# Patient Record
Sex: FEMALE | Race: BLACK OR AFRICAN AMERICAN | NOT HISPANIC OR LATINO | URBAN - METROPOLITAN AREA
[De-identification: names, ages, dates, MRNs, and addresses within clinical notes are randomized per-mention and may not be internally consistent; named-entity substitution may affect disease eponyms.]

---

## 2020-12-10 ENCOUNTER — ON CAMPUS - OUTPATIENT (OUTPATIENT)
Dept: URBAN - METROPOLITAN AREA HOSPITAL 108 | Facility: HOSPITAL | Age: 55
End: 2020-12-10
Payer: COMMERCIAL

## 2020-12-10 DIAGNOSIS — K29.50 UNSPECIFIED CHRONIC GASTRITIS WITHOUT BLEEDING: ICD-10-CM

## 2020-12-10 DIAGNOSIS — K86.89 OTHER SPECIFIED DISEASES OF PANCREAS: ICD-10-CM

## 2020-12-10 DIAGNOSIS — R19.09 OTHER INTRA-ABDOMINAL AND PELVIC SWELLING, MASS AND LUMP: ICD-10-CM

## 2020-12-10 DIAGNOSIS — B96.81 HELICOBACTER PYLORI [H. PYLORI] AS THE CAUSE OF DISEASES CLA: ICD-10-CM

## 2020-12-10 DIAGNOSIS — D17.5 BENIGN LIPOMATOUS NEOPLASM OF INTRA-ABDOMINAL ORGANS: ICD-10-CM

## 2020-12-10 PROCEDURE — 43242 EGD US FINE NEEDLE BX/ASPIR: CPT

## 2020-12-10 PROCEDURE — 43239 EGD BIOPSY SINGLE/MULTIPLE: CPT | Mod: 59

## 2023-03-15 ENCOUNTER — TELEPHONE (OUTPATIENT)
Dept: ORTHOPEDIC SURGERY | Facility: HOSPITAL | Age: 58
End: 2023-03-15
Payer: COMMERCIAL

## 2023-03-15 NOTE — TELEPHONE ENCOUNTER
Risk Factor yes no   Age >75  X   BMI <20 >40  X   Patient History     Chronic Pain (2 or more meds/Pain Management)  X   ETOH (more than 3 drinks Daily)  X   Uncontrolled Depression/Bipolar/Schizoaffective Disorder  X   Arrhythmias  X   Stent placement/MI  X   DVT/PE  X   Pacemaker  X   HTN (uncontrolled or requiring more than 2 medications)  X   CHF/Retained fluids/Edema  X   Stroke with Residual   X   COPD/Asthma  X   DENZEL--Non-compliant with CPAP  X   Diabetes (on insulin or more than 2 meds)         A1C:  X   BPH/Urinary retention (on medication)  X   CKD  X   Home environment and support     Current ambulation status (use of cane, walker, W/C, Multiple falls/weakness)  X   Stairs to enter and throughout home X    Lives Alone  X   Doesn’t have support at home  X     Outpatient Screening Assessment    Home needs: (Walker/BSC):  Has walker  ? Steps 3 steps  Caregiver 24-48hrs post-discharge:  and daughter    Discharge Plan:  HH PT     Prescriptions: Meds to bed    Home medications: NO MEDICATIONS ON FILE.   ? Blood thinner/anti-coag therapy--   ? BPH or diuretic--  ? BP meds--  ? Pain/Anti-inflammatories--  Pre-op Education:  Educate patient on spinal anesthesia/pain control:  ? patient verbalize understanding    Educate patient on hospital course/timeline:  ?  patient verbalize understanding    Joint Care Class:  ?  yes ? no    Notes:   Nothing on File. PAT 3/17  Uni VS TKA.  Does have a high deductible and would like to have HH PT over the bike as she doesn’t think she would be inclined to do the bike and exercises on her own.

## 2023-03-17 ENCOUNTER — PRE-ADMISSION TESTING (OUTPATIENT)
Dept: PREADMISSION TESTING | Facility: HOSPITAL | Age: 58
End: 2023-03-17
Payer: COMMERCIAL

## 2023-03-17 ENCOUNTER — HOSPITAL ENCOUNTER (OUTPATIENT)
Dept: GENERAL RADIOLOGY | Facility: HOSPITAL | Age: 58
Discharge: HOME | End: 2023-03-17
Payer: COMMERCIAL

## 2023-03-17 VITALS
DIASTOLIC BLOOD PRESSURE: 88 MMHG | SYSTOLIC BLOOD PRESSURE: 147 MMHG | TEMPERATURE: 97.6 F | RESPIRATION RATE: 18 BRPM | BODY MASS INDEX: 36.08 KG/M2 | HEART RATE: 78 BPM | OXYGEN SATURATION: 100 % | WEIGHT: 252 LBS | HEIGHT: 70 IN

## 2023-03-17 LAB
ALBUMIN SERPL-MCNC: 4.2 G/DL (ref 3.5–5.2)
ALBUMIN/GLOB SERPL: 1.4 G/DL
ALP SERPL-CCNC: 86 U/L (ref 39–117)
ALT SERPL W P-5'-P-CCNC: 14 U/L (ref 1–33)
ANION GAP SERPL CALCULATED.3IONS-SCNC: 9 MMOL/L (ref 5–15)
AST SERPL-CCNC: 16 U/L (ref 1–32)
BACTERIA UR QL AUTO: ABNORMAL /HPF
BILIRUB SERPL-MCNC: 0.4 MG/DL (ref 0–1.2)
BILIRUB UR QL STRIP: NEGATIVE
BUN SERPL-MCNC: 15 MG/DL (ref 6–20)
BUN/CREAT SERPL: 21.4 (ref 7–25)
CALCIUM SPEC-SCNC: 9 MG/DL (ref 8.6–10.5)
CHLORIDE SERPL-SCNC: 108 MMOL/L (ref 98–107)
CLARITY UR: CLEAR
CO2 SERPL-SCNC: 26 MMOL/L (ref 22–29)
COLOR UR: YELLOW
CREAT SERPL-MCNC: 0.7 MG/DL (ref 0.57–1)
DEPRECATED RDW RBC AUTO: 42.1 FL (ref 37–54)
EGFRCR SERPLBLD CKD-EPI 2021: 100.4 ML/MIN/1.73
ERYTHROCYTE [DISTWIDTH] IN BLOOD BY AUTOMATED COUNT: 15.9 % (ref 12.3–15.4)
GLOBULIN UR ELPH-MCNC: 3 GM/DL
GLUCOSE SERPL-MCNC: 94 MG/DL (ref 65–99)
GLUCOSE UR STRIP-MCNC: NEGATIVE MG/DL
HBA1C MFR BLD: 5.9 % (ref 4.8–5.6)
HCT VFR BLD AUTO: 34.9 % (ref 34–46.6)
HGB BLD-MCNC: 10.6 G/DL (ref 12–15.9)
HGB UR QL STRIP.AUTO: NEGATIVE
HYALINE CASTS UR QL AUTO: ABNORMAL /LPF
KETONES UR QL STRIP: ABNORMAL
LEUKOCYTE ESTERASE UR QL STRIP.AUTO: ABNORMAL
MCH RBC QN AUTO: 22.4 PG (ref 26.6–33)
MCHC RBC AUTO-ENTMCNC: 30.4 G/DL (ref 31.5–35.7)
MCV RBC AUTO: 73.6 FL (ref 79–97)
NITRITE UR QL STRIP: NEGATIVE
PH UR STRIP.AUTO: 5.5 [PH] (ref 5–8)
PLATELET # BLD AUTO: 348 10*3/MM3 (ref 140–450)
PMV BLD AUTO: 9.4 FL (ref 6–12)
POTASSIUM SERPL-SCNC: 4.2 MMOL/L (ref 3.5–5.2)
PROT SERPL-MCNC: 7.2 G/DL (ref 6–8.5)
PROT UR QL STRIP: ABNORMAL
QT INTERVAL: 391 MS
RBC # BLD AUTO: 4.74 10*6/MM3 (ref 3.77–5.28)
RBC # UR STRIP: ABNORMAL /HPF
REF LAB TEST METHOD: ABNORMAL
SODIUM SERPL-SCNC: 143 MMOL/L (ref 136–145)
SP GR UR STRIP: 1.03 (ref 1–1.03)
SQUAMOUS #/AREA URNS HPF: ABNORMAL /HPF
UROBILINOGEN UR QL STRIP: ABNORMAL
WBC # UR STRIP: ABNORMAL /HPF
WBC NRBC COR # BLD: 7.47 10*3/MM3 (ref 3.4–10.8)

## 2023-03-17 PROCEDURE — 71046 X-RAY EXAM CHEST 2 VIEWS: CPT

## 2023-03-17 PROCEDURE — 93010 ELECTROCARDIOGRAM REPORT: CPT | Performed by: INTERNAL MEDICINE

## 2023-03-17 PROCEDURE — 93005 ELECTROCARDIOGRAM TRACING: CPT

## 2023-03-17 PROCEDURE — 87081 CULTURE SCREEN ONLY: CPT

## 2023-03-17 PROCEDURE — 80053 COMPREHEN METABOLIC PANEL: CPT

## 2023-03-17 PROCEDURE — 85027 COMPLETE CBC AUTOMATED: CPT

## 2023-03-17 PROCEDURE — 36415 COLL VENOUS BLD VENIPUNCTURE: CPT

## 2023-03-17 PROCEDURE — 83036 HEMOGLOBIN GLYCOSYLATED A1C: CPT

## 2023-03-17 PROCEDURE — 81001 URINALYSIS AUTO W/SCOPE: CPT

## 2023-03-17 RX ORDER — CITALOPRAM 20 MG/1
1 TABLET ORAL DAILY PRN
COMMUNITY
Start: 2023-01-01

## 2023-03-17 RX ORDER — DIAZEPAM 5 MG/1
1 TABLET ORAL AS NEEDED
COMMUNITY
Start: 2023-01-01

## 2023-03-17 RX ORDER — PRAVASTATIN SODIUM 10 MG
10 TABLET ORAL DAILY
COMMUNITY

## 2023-03-17 RX ORDER — CHLORHEXIDINE GLUCONATE 500 MG/1
CLOTH TOPICAL
Status: ON HOLD | COMMUNITY
End: 2023-03-23

## 2023-03-17 RX ORDER — HYDROCHLOROTHIAZIDE 12.5 MG/1
12.5 TABLET ORAL DAILY
COMMUNITY

## 2023-03-17 NOTE — DISCHARGE INSTRUCTIONS
ARRIVE DAY OF SURGERY AT  8:00 AM        Take the following medications the morning of surgery:  NONE      If you are on prescription narcotic pain medication to control your pain you may also take that medication the morning of surgery.    General Instructions:  Do not eat solid food after midnight the night before surgery.  You may drink clear liquids day of surgery but must stop at least one hour before your hospital arrival time.  It is beneficial for you to have a clear drink that contains carbohydrates the day of surgery.  We suggest a 12 to 20 ounce bottle of Gatorade or Powerade for non-diabetic patients or a 12 to 20 ounce bottle of G2 or Powerade Zero for diabetic patients. (Pediatric patients, are not advised to drink a 12 to 20 ounce carbohydrate drink)    Clear liquids are liquids you can see through.  Nothing red in color.     Plain water                               Sports drinks  Sodas                                   Gelatin (Jell-O)  Fruit juices without pulp such as white grape juice and apple juice  Popsicles that contain no fruit or yogurt  Tea or coffee (no cream or milk added)  Gatorade / Powerade  G2 / Powerade Zero    Infants may have breast milk up to four hours before surgery.  Infants drinking formula may drink formula up to six hours before surgery.   Patients who avoid smoking, chewing tobacco and alcohol for 4 weeks prior to surgery have a reduced risk of post-operative complications.  Quit smoking as many days before surgery as you can.  Do not smoke, use chewing tobacco or drink alcohol the day of surgery.   If applicable bring your C-PAP/ BI-PAP machine.  Bring any papers given to you in the doctor’s office.  Wear clean comfortable clothes.  Do not wear contact lenses, false eyelashes or make-up.  Bring a case for your glasses.   Bring crutches or walker if applicable.  Remove all piercings.  Leave jewelry and any other valuables at home.  Hair extensions with metal clips must be  removed prior to surgery.  The Pre-Admission Testing nurse will instruct you to bring medications if unable to obtain an accurate list in Pre-Admission Testing.          Preventing a Surgical Site Infection:  For 2 to 3 days before surgery, avoid shaving with a razor because the razor can irritate skin and make it easier to develop an infection.    Any areas of open skin can increase the risk of a post-operative wound infection by allowing bacteria to enter and travel throughout the body.  Notify your surgeon if you have any skin wounds / rashes even if it is not near the expected surgical site.  The area will need assessed to determine if surgery should be delayed until it is healed.  The night prior to surgery shower using a fresh bar of anti-bacterial soap (such as Dial) and clean washcloth.  Sleep in a clean bed with clean clothing.  Do not allow pets to sleep with you.  Shower on the morning of surgery using a fresh bar of anti-bacterial soap (such as Dial) and clean washcloth.  Dry with a clean towel and dress in clean clothing.  Ask your surgeon if you will be receiving antibiotics prior to surgery.  Make sure you, your family, and all healthcare providers clean their hands with soap and water or an alcohol based hand  before caring for you or your wound.    Day of surgery:  Your arrival time is approximately two hours before your scheduled surgery time.  Upon arrival, a Pre-op nurse and Anesthesiologist will review your health history, obtain vital signs, and answer questions you may have.  The only belongings needed at this time will be a list of your home medications and if applicable your C-PAP/BI-PAP machine.  A Pre-op nurse will start an IV and you may receive medication in preparation for surgery, including something to help you relax.     Please be aware that surgery does come with discomfort.  We want to make every effort to control your discomfort so please discuss any uncontrolled symptoms  with your nurse.   Your doctor will most likely have prescribed pain medications.      If you are going home after surgery you will receive individualized written care instructions before being discharged.  A responsible adult must drive you to and from the hospital on the day of your surgery and stay with you for 24 hours.  Discharge prescriptions can be filled by the hospital pharmacy during regular pharmacy hours.  If you are having surgery late in the day/evening your prescription may be e-prescribed to your pharmacy.  Please verify your pharmacy hours or chose a 24 hour pharmacy to avoid not having access to your prescription because your pharmacy has closed for the day.    If you are staying overnight following surgery, you will be transported to your hospital room following the recovery period.  [facility] has all private rooms.    If you have any questions please call Pre-Admission Testing at [phone].  Deductibles and co-payments are collected on the day of service. Please be prepared to pay the required co-pay, deductible or deposit on the day of service as defined by your plan.    Call your surgeon immediately if you experience any of the following symptoms:  Sore Throat  Shortness of Breath or difficulty breathing  Cough  Chills  Body soreness or muscle pain  Headache  Fever  New loss of taste or smell  Do not arrive for your surgery ill.  Your procedure will need to be rescheduled to another time.  You will need to call your physician before the day of surgery to avoid any unnecessary exposure to hospital staff as well as other patients.       CHLORHEXIDINE CLOTH INSTRUCTIONS  The morning of surgery follow these instructions using the Chlorhexidine cloths you've been given.  These steps reduce bacteria on the body.  Do not use the cloths near your eyes, ears mouth, genitalia or on open wounds.  Throw the cloths away after use but do not try to flush them down a toilet.      Open and  remove one cloth at a time from the package.    Leave the cloth unfolded and begin the bathing.  Massage the skin with the cloths using gentle pressure to remove bacteria.  Do not scrub harshly.   Follow the steps below with one 2% CHG cloth per area (6 total cloths).  One cloth for neck, shoulders and chest.  One cloth for both arms, hands, fingers and underarms (do underarms last).  One cloth for the abdomen followed by groin.  One cloth for right leg and foot including between the toes.  One cloth for left leg and foot including between the toes.  The last cloth is to be used for the back of the neck, back and buttocks.    Allow the CHG to air dry 3 minutes on the skin which will give it time to work and decrease the chance of irritation.  The skin may feel sticky until it is dry.  Do not rinse with water or any other liquid or you will lose the beneficial effects of the CHG.  If mild skin irritation occurs, do rinse the skin to remove the CHG.  Report this to the nurse at time of admission.  Do not apply lotions, creams, ointments, deodorants or perfumes after using the clothes. Dress in clean clothes before coming to the hospital.

## 2023-03-19 LAB — MRSA SPEC QL CULT: NORMAL

## 2023-03-23 ENCOUNTER — ANESTHESIA EVENT (OUTPATIENT)
Dept: PERIOP | Facility: HOSPITAL | Age: 58
End: 2023-03-23
Payer: COMMERCIAL

## 2023-03-23 ENCOUNTER — ANESTHESIA (OUTPATIENT)
Dept: PERIOP | Facility: HOSPITAL | Age: 58
End: 2023-03-23
Payer: COMMERCIAL

## 2023-03-23 ENCOUNTER — APPOINTMENT (OUTPATIENT)
Dept: GENERAL RADIOLOGY | Facility: HOSPITAL | Age: 58
End: 2023-03-23
Payer: COMMERCIAL

## 2023-03-23 ENCOUNTER — HOSPITAL ENCOUNTER (OUTPATIENT)
Facility: HOSPITAL | Age: 58
Discharge: HOME HEALTH | End: 2023-03-24
Attending: ORTHOPAEDIC SURGERY | Admitting: ORTHOPAEDIC SURGERY
Payer: COMMERCIAL

## 2023-03-23 DIAGNOSIS — Z96.652 STATUS POST LEFT PARTIAL KNEE REPLACEMENT: Primary | ICD-10-CM

## 2023-03-23 PROCEDURE — 25010000002 ONDANSETRON PER 1 MG: Performed by: ORTHOPAEDIC SURGERY

## 2023-03-23 PROCEDURE — 25010000002 CEFAZOLIN IN DEXTROSE 2-4 GM/100ML-% SOLUTION: Performed by: ORTHOPAEDIC SURGERY

## 2023-03-23 PROCEDURE — 25010000002 PROPOFOL 1000 MG/100ML EMULSION: Performed by: ANESTHESIOLOGY

## 2023-03-23 PROCEDURE — 73560 X-RAY EXAM OF KNEE 1 OR 2: CPT

## 2023-03-23 PROCEDURE — C1713 ANCHOR/SCREW BN/BN,TIS/BN: HCPCS | Performed by: ORTHOPAEDIC SURGERY

## 2023-03-23 PROCEDURE — 25010000002 MIDAZOLAM PER 1 MG: Performed by: ANESTHESIOLOGY

## 2023-03-23 PROCEDURE — 25010000002 DEXAMETHASONE SODIUM PHOSPHATE 20 MG/5ML SOLUTION: Performed by: ANESTHESIOLOGY

## 2023-03-23 PROCEDURE — G0378 HOSPITAL OBSERVATION PER HR: HCPCS

## 2023-03-23 PROCEDURE — 25010000002 HYDROMORPHONE 1 MG/ML SOLUTION: Performed by: ANESTHESIOLOGY

## 2023-03-23 PROCEDURE — 25010000002 ROPIVACAINE PER 1 MG: Performed by: ANESTHESIOLOGY

## 2023-03-23 PROCEDURE — 97530 THERAPEUTIC ACTIVITIES: CPT

## 2023-03-23 PROCEDURE — 25010000002 FENTANYL CITRATE (PF) 50 MCG/ML SOLUTION: Performed by: ANESTHESIOLOGY

## 2023-03-23 PROCEDURE — 25010000002 ONDANSETRON PER 1 MG: Performed by: ANESTHESIOLOGY

## 2023-03-23 PROCEDURE — 25010000002 KETOROLAC TROMETHAMINE PER 15 MG: Performed by: ORTHOPAEDIC SURGERY

## 2023-03-23 PROCEDURE — C1776 JOINT DEVICE (IMPLANTABLE): HCPCS | Performed by: ORTHOPAEDIC SURGERY

## 2023-03-23 PROCEDURE — C9290 INJ, BUPIVACAINE LIPOSOME: HCPCS | Performed by: ORTHOPAEDIC SURGERY

## 2023-03-23 PROCEDURE — 25010000002 FENTANYL CITRATE (PF) 50 MCG/ML SOLUTION: Performed by: NURSE ANESTHETIST, CERTIFIED REGISTERED

## 2023-03-23 PROCEDURE — 0 BUPIVACAINE LIPOSOME 1.3 % SUSPENSION 20 ML VIAL: Performed by: ORTHOPAEDIC SURGERY

## 2023-03-23 PROCEDURE — 97161 PT EVAL LOW COMPLEX 20 MIN: CPT

## 2023-03-23 DEVICE — IMPLANTABLE DEVICE: Type: IMPLANTABLE DEVICE | Site: KNEE | Status: FUNCTIONAL

## 2023-03-23 DEVICE — DEV CONTRL TISS STRATAFIX SYMM PDS PLUS VIL CT-1 45CM: Type: IMPLANTABLE DEVICE | Site: KNEE | Status: FUNCTIONAL

## 2023-03-23 DEVICE — CMT BONE R 1X40: Type: IMPLANTABLE DEVICE | Site: KNEE | Status: FUNCTIONAL

## 2023-03-23 DEVICE — DEV CONTRL TISS STRATAFIXSPIRALMNCRYL PLSPS2 REV3/0 45CM: Type: IMPLANTABLE DEVICE | Site: KNEE | Status: FUNCTIONAL

## 2023-03-23 DEVICE — CAP PART KN VANGUARD M: Type: IMPLANTABLE DEVICE | Status: FUNCTIONAL

## 2023-03-23 RX ORDER — AMOXICILLIN 250 MG
1 CAPSULE ORAL 2 TIMES DAILY
Status: DISCONTINUED | OUTPATIENT
Start: 2023-03-23 | End: 2023-03-24 | Stop reason: HOSPADM

## 2023-03-23 RX ORDER — CEFAZOLIN SODIUM 2 G/100ML
2 INJECTION, SOLUTION INTRAVENOUS ONCE
Status: COMPLETED | OUTPATIENT
Start: 2023-03-23 | End: 2023-03-23

## 2023-03-23 RX ORDER — ONDANSETRON 2 MG/ML
4 INJECTION INTRAMUSCULAR; INTRAVENOUS ONCE AS NEEDED
Status: DISCONTINUED | OUTPATIENT
Start: 2023-03-23 | End: 2023-03-23 | Stop reason: HOSPADM

## 2023-03-23 RX ORDER — FENTANYL CITRATE 50 UG/ML
50 INJECTION, SOLUTION INTRAMUSCULAR; INTRAVENOUS
Status: DISCONTINUED | OUTPATIENT
Start: 2023-03-23 | End: 2023-03-23 | Stop reason: HOSPADM

## 2023-03-23 RX ORDER — EPHEDRINE SULFATE 50 MG/ML
5 INJECTION, SOLUTION INTRAVENOUS ONCE AS NEEDED
Status: DISCONTINUED | OUTPATIENT
Start: 2023-03-23 | End: 2023-03-23 | Stop reason: HOSPADM

## 2023-03-23 RX ORDER — ASPIRIN 81 MG/1
81 TABLET ORAL EVERY 12 HOURS SCHEDULED
Status: DISCONTINUED | OUTPATIENT
Start: 2023-03-24 | End: 2023-03-24 | Stop reason: HOSPADM

## 2023-03-23 RX ORDER — FLUMAZENIL 0.1 MG/ML
0.2 INJECTION INTRAVENOUS AS NEEDED
Status: DISCONTINUED | OUTPATIENT
Start: 2023-03-23 | End: 2023-03-23 | Stop reason: HOSPADM

## 2023-03-23 RX ORDER — PROMETHAZINE HYDROCHLORIDE 25 MG/1
25 SUPPOSITORY RECTAL ONCE AS NEEDED
Status: DISCONTINUED | OUTPATIENT
Start: 2023-03-23 | End: 2023-03-23 | Stop reason: HOSPADM

## 2023-03-23 RX ORDER — LIDOCAINE HYDROCHLORIDE 10 MG/ML
0.5 INJECTION, SOLUTION EPIDURAL; INFILTRATION; INTRACAUDAL; PERINEURAL ONCE AS NEEDED
Status: DISCONTINUED | OUTPATIENT
Start: 2023-03-23 | End: 2023-03-23 | Stop reason: HOSPADM

## 2023-03-23 RX ORDER — SODIUM CHLORIDE, SODIUM LACTATE, POTASSIUM CHLORIDE, CALCIUM CHLORIDE 600; 310; 30; 20 MG/100ML; MG/100ML; MG/100ML; MG/100ML
75 INJECTION, SOLUTION INTRAVENOUS CONTINUOUS
Status: ACTIVE | OUTPATIENT
Start: 2023-03-23 | End: 2023-03-23

## 2023-03-23 RX ORDER — FENTANYL CITRATE 50 UG/ML
100 INJECTION, SOLUTION INTRAMUSCULAR; INTRAVENOUS
Status: DISCONTINUED | OUTPATIENT
Start: 2023-03-23 | End: 2023-03-23 | Stop reason: HOSPADM

## 2023-03-23 RX ORDER — ONDANSETRON 2 MG/ML
INJECTION INTRAMUSCULAR; INTRAVENOUS AS NEEDED
Status: DISCONTINUED | OUTPATIENT
Start: 2023-03-23 | End: 2023-03-23 | Stop reason: SURG

## 2023-03-23 RX ORDER — HYDROCODONE BITARTRATE AND ACETAMINOPHEN 10; 325 MG/1; MG/1
1 TABLET ORAL EVERY 4 HOURS PRN
Status: DISCONTINUED | OUTPATIENT
Start: 2023-03-23 | End: 2023-03-23

## 2023-03-23 RX ORDER — SODIUM CHLORIDE 0.9 % (FLUSH) 0.9 %
3 SYRINGE (ML) INJECTION EVERY 12 HOURS SCHEDULED
Status: DISCONTINUED | OUTPATIENT
Start: 2023-03-23 | End: 2023-03-23 | Stop reason: HOSPADM

## 2023-03-23 RX ORDER — KETOROLAC TROMETHAMINE 30 MG/ML
30 INJECTION, SOLUTION INTRAMUSCULAR; INTRAVENOUS EVERY 6 HOURS PRN
Status: DISCONTINUED | OUTPATIENT
Start: 2023-03-23 | End: 2023-03-24 | Stop reason: HOSPADM

## 2023-03-23 RX ORDER — SODIUM CHLORIDE, SODIUM LACTATE, POTASSIUM CHLORIDE, CALCIUM CHLORIDE 600; 310; 30; 20 MG/100ML; MG/100ML; MG/100ML; MG/100ML
9 INJECTION, SOLUTION INTRAVENOUS CONTINUOUS
Status: DISCONTINUED | OUTPATIENT
Start: 2023-03-23 | End: 2023-03-24 | Stop reason: HOSPADM

## 2023-03-23 RX ORDER — PRAVASTATIN SODIUM 10 MG
10 TABLET ORAL DAILY
Status: DISCONTINUED | OUTPATIENT
Start: 2023-03-23 | End: 2023-03-24 | Stop reason: HOSPADM

## 2023-03-23 RX ORDER — SODIUM CHLORIDE 9 MG/ML
INJECTION, SOLUTION INTRAVENOUS AS NEEDED
Status: DISCONTINUED | OUTPATIENT
Start: 2023-03-23 | End: 2023-03-23 | Stop reason: HOSPADM

## 2023-03-23 RX ORDER — OXYCODONE AND ACETAMINOPHEN 7.5; 325 MG/1; MG/1
1 TABLET ORAL EVERY 4 HOURS PRN
Status: DISCONTINUED | OUTPATIENT
Start: 2023-03-23 | End: 2023-03-23 | Stop reason: HOSPADM

## 2023-03-23 RX ORDER — HYDRALAZINE HYDROCHLORIDE 20 MG/ML
5 INJECTION INTRAMUSCULAR; INTRAVENOUS
Status: DISCONTINUED | OUTPATIENT
Start: 2023-03-23 | End: 2023-03-23 | Stop reason: HOSPADM

## 2023-03-23 RX ORDER — OXYCODONE HYDROCHLORIDE AND ACETAMINOPHEN 5; 325 MG/1; MG/1
1 TABLET ORAL EVERY 4 HOURS PRN
Status: DISCONTINUED | OUTPATIENT
Start: 2023-03-23 | End: 2023-03-24 | Stop reason: HOSPADM

## 2023-03-23 RX ORDER — NALOXONE HCL 0.4 MG/ML
0.2 VIAL (ML) INJECTION AS NEEDED
Status: DISCONTINUED | OUTPATIENT
Start: 2023-03-23 | End: 2023-03-23 | Stop reason: HOSPADM

## 2023-03-23 RX ORDER — HYDROCODONE BITARTRATE AND ACETAMINOPHEN 10; 325 MG/1; MG/1
2 TABLET ORAL EVERY 4 HOURS PRN
Status: DISCONTINUED | OUTPATIENT
Start: 2023-03-23 | End: 2023-03-23

## 2023-03-23 RX ORDER — HYDROMORPHONE HYDROCHLORIDE 1 MG/ML
0.5 INJECTION, SOLUTION INTRAMUSCULAR; INTRAVENOUS; SUBCUTANEOUS
Status: DISCONTINUED | OUTPATIENT
Start: 2023-03-23 | End: 2023-03-23 | Stop reason: HOSPADM

## 2023-03-23 RX ORDER — DEXAMETHASONE SODIUM PHOSPHATE 4 MG/ML
INJECTION, SOLUTION INTRA-ARTICULAR; INTRALESIONAL; INTRAMUSCULAR; INTRAVENOUS; SOFT TISSUE AS NEEDED
Status: DISCONTINUED | OUTPATIENT
Start: 2023-03-23 | End: 2023-03-23 | Stop reason: SURG

## 2023-03-23 RX ORDER — LABETALOL HYDROCHLORIDE 5 MG/ML
5 INJECTION, SOLUTION INTRAVENOUS
Status: DISCONTINUED | OUTPATIENT
Start: 2023-03-23 | End: 2023-03-23 | Stop reason: HOSPADM

## 2023-03-23 RX ORDER — OXYCODONE HYDROCHLORIDE AND ACETAMINOPHEN 5; 325 MG/1; MG/1
2 TABLET ORAL EVERY 4 HOURS PRN
Status: DISCONTINUED | OUTPATIENT
Start: 2023-03-23 | End: 2023-03-24 | Stop reason: HOSPADM

## 2023-03-23 RX ORDER — IPRATROPIUM BROMIDE AND ALBUTEROL SULFATE 2.5; .5 MG/3ML; MG/3ML
3 SOLUTION RESPIRATORY (INHALATION) ONCE AS NEEDED
Status: DISCONTINUED | OUTPATIENT
Start: 2023-03-23 | End: 2023-03-23 | Stop reason: HOSPADM

## 2023-03-23 RX ORDER — DROPERIDOL 2.5 MG/ML
0.62 INJECTION, SOLUTION INTRAMUSCULAR; INTRAVENOUS
Status: DISCONTINUED | OUTPATIENT
Start: 2023-03-23 | End: 2023-03-23 | Stop reason: HOSPADM

## 2023-03-23 RX ORDER — HYDROMORPHONE HYDROCHLORIDE 1 MG/ML
0.5 INJECTION, SOLUTION INTRAMUSCULAR; INTRAVENOUS; SUBCUTANEOUS
Status: DISCONTINUED | OUTPATIENT
Start: 2023-03-23 | End: 2023-03-24 | Stop reason: HOSPADM

## 2023-03-23 RX ORDER — DIAZEPAM 5 MG/1
5 TABLET ORAL EVERY 6 HOURS PRN
Status: DISCONTINUED | OUTPATIENT
Start: 2023-03-23 | End: 2023-03-24 | Stop reason: HOSPADM

## 2023-03-23 RX ORDER — HYDROCODONE BITARTRATE AND ACETAMINOPHEN 7.5; 325 MG/1; MG/1
1 TABLET ORAL ONCE AS NEEDED
Status: COMPLETED | OUTPATIENT
Start: 2023-03-23 | End: 2023-03-23

## 2023-03-23 RX ORDER — DIPHENHYDRAMINE HYDROCHLORIDE 50 MG/ML
12.5 INJECTION INTRAMUSCULAR; INTRAVENOUS
Status: DISCONTINUED | OUTPATIENT
Start: 2023-03-23 | End: 2023-03-23 | Stop reason: HOSPADM

## 2023-03-23 RX ORDER — HYDROCHLOROTHIAZIDE 12.5 MG/1
12.5 TABLET ORAL DAILY
Status: DISCONTINUED | OUTPATIENT
Start: 2023-03-23 | End: 2023-03-24 | Stop reason: HOSPADM

## 2023-03-23 RX ORDER — SODIUM CHLORIDE 0.9 % (FLUSH) 0.9 %
3-10 SYRINGE (ML) INJECTION AS NEEDED
Status: DISCONTINUED | OUTPATIENT
Start: 2023-03-23 | End: 2023-03-23 | Stop reason: HOSPADM

## 2023-03-23 RX ORDER — PROMETHAZINE HYDROCHLORIDE 25 MG/1
25 TABLET ORAL ONCE AS NEEDED
Status: DISCONTINUED | OUTPATIENT
Start: 2023-03-23 | End: 2023-03-23 | Stop reason: HOSPADM

## 2023-03-23 RX ORDER — MIDAZOLAM HYDROCHLORIDE 1 MG/ML
2 INJECTION INTRAMUSCULAR; INTRAVENOUS
Status: DISCONTINUED | OUTPATIENT
Start: 2023-03-23 | End: 2023-03-23 | Stop reason: HOSPADM

## 2023-03-23 RX ORDER — CITALOPRAM 20 MG/1
20 TABLET ORAL DAILY
Status: DISCONTINUED | OUTPATIENT
Start: 2023-03-23 | End: 2023-03-24 | Stop reason: HOSPADM

## 2023-03-23 RX ORDER — ONDANSETRON 4 MG/1
4 TABLET, FILM COATED ORAL EVERY 6 HOURS PRN
Status: DISCONTINUED | OUTPATIENT
Start: 2023-03-23 | End: 2023-03-24 | Stop reason: HOSPADM

## 2023-03-23 RX ORDER — CEFAZOLIN SODIUM 2 G/100ML
2 INJECTION, SOLUTION INTRAVENOUS EVERY 8 HOURS
Status: COMPLETED | OUTPATIENT
Start: 2023-03-23 | End: 2023-03-24

## 2023-03-23 RX ORDER — NALOXONE HCL 0.4 MG/ML
0.1 VIAL (ML) INJECTION
Status: DISCONTINUED | OUTPATIENT
Start: 2023-03-23 | End: 2023-03-24 | Stop reason: HOSPADM

## 2023-03-23 RX ORDER — FENTANYL CITRATE 50 UG/ML
INJECTION, SOLUTION INTRAMUSCULAR; INTRAVENOUS AS NEEDED
Status: DISCONTINUED | OUTPATIENT
Start: 2023-03-23 | End: 2023-03-23 | Stop reason: SURG

## 2023-03-23 RX ORDER — LIDOCAINE HYDROCHLORIDE 20 MG/ML
INJECTION, SOLUTION INTRAVENOUS AS NEEDED
Status: DISCONTINUED | OUTPATIENT
Start: 2023-03-23 | End: 2023-03-23 | Stop reason: SURG

## 2023-03-23 RX ORDER — ONDANSETRON 2 MG/ML
4 INJECTION INTRAMUSCULAR; INTRAVENOUS EVERY 6 HOURS PRN
Status: DISCONTINUED | OUTPATIENT
Start: 2023-03-23 | End: 2023-03-24 | Stop reason: HOSPADM

## 2023-03-23 RX ORDER — PROPOFOL 10 MG/ML
INJECTION, EMULSION INTRAVENOUS AS NEEDED
Status: DISCONTINUED | OUTPATIENT
Start: 2023-03-23 | End: 2023-03-23 | Stop reason: SURG

## 2023-03-23 RX ORDER — ROPIVACAINE HYDROCHLORIDE 2 MG/ML
INJECTION, SOLUTION EPIDURAL; INFILTRATION; PERINEURAL
Status: COMPLETED | OUTPATIENT
Start: 2023-03-23 | End: 2023-03-23

## 2023-03-23 RX ORDER — TRANEXAMIC ACID 100 MG/ML
INJECTION, SOLUTION INTRAVENOUS AS NEEDED
Status: DISCONTINUED | OUTPATIENT
Start: 2023-03-23 | End: 2023-03-23 | Stop reason: SURG

## 2023-03-23 RX ADMIN — CEFAZOLIN SODIUM 2 G: 2 INJECTION, SOLUTION INTRAVENOUS at 16:16

## 2023-03-23 RX ADMIN — ROPIVACAINE HYDROCHLORIDE 15 ML: 2 INJECTION, SOLUTION EPIDURAL; INFILTRATION at 07:45

## 2023-03-23 RX ADMIN — PROPOFOL 150 MG: 10 INJECTION, EMULSION INTRAVENOUS at 09:13

## 2023-03-23 RX ADMIN — ONDANSETRON 4 MG: 2 INJECTION INTRAMUSCULAR; INTRAVENOUS at 18:55

## 2023-03-23 RX ADMIN — FENTANYL CITRATE 50 MCG: 50 INJECTION, SOLUTION INTRAMUSCULAR; INTRAVENOUS at 09:31

## 2023-03-23 RX ADMIN — FENTANYL CITRATE 50 MCG: 50 INJECTION, SOLUTION INTRAMUSCULAR; INTRAVENOUS at 09:17

## 2023-03-23 RX ADMIN — HYDROCODONE BITARTRATE AND ACETAMINOPHEN 2 TABLET: 10; 325 TABLET ORAL at 16:16

## 2023-03-23 RX ADMIN — MIDAZOLAM 2 MG: 1 INJECTION INTRAMUSCULAR; INTRAVENOUS at 07:40

## 2023-03-23 RX ADMIN — LIDOCAINE HYDROCHLORIDE 100 MG: 20 INJECTION, SOLUTION INTRAVENOUS at 09:13

## 2023-03-23 RX ADMIN — KETOROLAC TROMETHAMINE 30 MG: 30 INJECTION, SOLUTION INTRAMUSCULAR at 13:40

## 2023-03-23 RX ADMIN — ONDANSETRON 4 MG: 2 INJECTION INTRAMUSCULAR; INTRAVENOUS at 09:23

## 2023-03-23 RX ADMIN — OXYCODONE AND ACETAMINOPHEN 2 TABLET: 5; 325 TABLET ORAL at 20:30

## 2023-03-23 RX ADMIN — SODIUM CHLORIDE, POTASSIUM CHLORIDE, SODIUM LACTATE AND CALCIUM CHLORIDE: 600; 310; 30; 20 INJECTION, SOLUTION INTRAVENOUS at 08:51

## 2023-03-23 RX ADMIN — TRANEXAMIC ACID 1000 MG: 1 INJECTION, SOLUTION INTRAVENOUS at 09:18

## 2023-03-23 RX ADMIN — CEFAZOLIN SODIUM 2 G: 2 INJECTION, SOLUTION INTRAVENOUS at 08:56

## 2023-03-23 RX ADMIN — TRANEXAMIC ACID 1000 MG: 1 INJECTION, SOLUTION INTRAVENOUS at 10:41

## 2023-03-23 RX ADMIN — FENTANYL CITRATE 50 MCG: 50 INJECTION, SOLUTION INTRAMUSCULAR; INTRAVENOUS at 12:02

## 2023-03-23 RX ADMIN — FENTANYL CITRATE 50 MCG: 50 INJECTION, SOLUTION INTRAMUSCULAR; INTRAVENOUS at 07:40

## 2023-03-23 RX ADMIN — HYDROMORPHONE HYDROCHLORIDE 0.25 MG: 1 INJECTION, SOLUTION INTRAMUSCULAR; INTRAVENOUS; SUBCUTANEOUS at 10:24

## 2023-03-23 RX ADMIN — HYDROMORPHONE HYDROCHLORIDE 0.25 MG: 1 INJECTION, SOLUTION INTRAMUSCULAR; INTRAVENOUS; SUBCUTANEOUS at 10:38

## 2023-03-23 RX ADMIN — DEXAMETHASONE SODIUM PHOSPHATE 8 MG: 4 INJECTION, SOLUTION INTRAMUSCULAR; INTRAVENOUS at 09:23

## 2023-03-23 RX ADMIN — DOCUSATE SODIUM 50MG AND SENNOSIDES 8.6MG 1 TABLET: 8.6; 5 TABLET, FILM COATED ORAL at 20:29

## 2023-03-23 RX ADMIN — HYDROCODONE BITARTRATE AND ACETAMINOPHEN 1 TABLET: 7.5; 325 TABLET ORAL at 11:58

## 2023-03-23 NOTE — ANESTHESIA POSTPROCEDURE EVALUATION
Patient: Lorenzo Parekh    Procedure Summary     Date: 03/23/23 Room / Location:  [facility]  [facility]    Anesthesia Start: 0859 Anesthesia Stop: 1126    Procedure: LEFT KNEE MEDIAL UNICOMPARTMENTAL ARTHROPLASTY (Left: Knee) Diagnosis:     Surgeons: Polo Jarvis MD Provider: Gilberto Burciaga MD    Anesthesia Type: general with block ASA Status: 3          Anesthesia Type: general with block    Vitals  Vitals Value Taken Time   /91 03/23/23 1245   Temp 36.6 °C (97.8 °F) 03/23/23 1125   Pulse 70 03/23/23 1249   Resp 16 03/23/23 1215   SpO2 96 % 03/23/23 1249   Vitals shown include unvalidated device data.        Post Anesthesia Care and Evaluation    Patient location during evaluation: bedside  Patient participation: complete - patient participated  Level of consciousness: awake  Pain management: adequate    Airway patency: patent  Anesthetic complications: No anesthetic complications  PONV Status: controlled  Cardiovascular status: acceptable  Respiratory status: acceptable  Hydration status: acceptable    Comments: --------------------            03/23/23               1215     --------------------   BP:       169/98     Pulse:      69       Resp:       16       Temp:                SpO2:      95%      --------------------

## 2023-03-23 NOTE — ANESTHESIA PROCEDURE NOTES
Airway  Urgency: elective    Date/Time: 3/23/2023 9:13 AM    General Information and Staff    Patient location during procedure: OR  Anesthesiologist: Gilberto Burciaga MD    Indications and Patient Condition  Indications for airway management: airway protection    Preoxygenated: yes  Mask difficulty assessment: 1 - vent by mask    Final Airway Details  Final airway type: supraglottic airway      Successful airway: Size 4     Number of attempts at approach: 1  Assessment: lips, teeth, and gum same as pre-op and atraumatic intubation

## 2023-03-23 NOTE — H&P
Orthopaedic H&P      Patient: Lorenzo Parekh    Date of Admission: 3/23/2023  6:34 AM    YOB: 1965    Medical Record Number: [id number]    Attending Physician:  Polo Jarvis MD    Chief Complaints: Left knee OA      History of Present Illness: 58 y.o. female admitted to [facility] for left knee medial compartment you knee arthroplasty versus total knee arthroplasty.  Onset of symptoms was gradual starting 1 year ago.  Symptoms are associated with left knee pain.  Symptoms are aggravated by weightbearing.   Symptoms improve with rest.     Allergies: No Known Allergies    Medications:   Home Medications:  Medications Prior to Admission   Medication Sig Dispense Refill Last Dose   • Chlorhexidine Gluconate Cloth 2 % pads Apply  topically. AS DIRECTED PREOP   3/23/2023   • citalopram (CeleXA) 20 MG tablet Take 1 tablet by mouth Daily.      • diazePAM (VALIUM) 5 MG tablet Take 1 tablet by mouth As Needed.      • hydroCHLOROthiazide (HYDRODIURIL) 12.5 MG oral Take 1 each by mouth Daily.      • pravastatin (PRAVACHOL) 10 MG tablet Take 1 tablet by mouth Daily.          Current Medications:  Scheduled Meds:ceFAZolin, 2 g, Intravenous, Once  sodium chloride, 3 mL, Intravenous, Q12H      Continuous Infusions:lactated ringers, 9 mL/hr      PRN Meds:.•  fentanyl  •  fentaNYL citrate (PF)  •  lidocaine PF 1%  •  midazolam  •  sodium chloride    Past Medical History:   Diagnosis Date   • Anxiety    • Arthritis    • History of COVID-19 08/2020   • History of kidney stones    • Hyperlipidemia    • Hypertension    • Knee pain, bilateral      Past Surgical History:   Procedure Laterality Date   • BARTHOLIN CYST MARSUPIALIZATION     • BREAST CYST EXCISION Bilateral    • CYSTOSCOPY W/ LASER LITHOTRIPSY     • LAPAROSCOPIC CHOLECYSTECTOMY     • LAPAROSCOPIC TUBAL LIGATION     • TIBIA FRACTURE SURGERY Right    • URETEROSCOPY BASKET EXTRACTION       Social History     Occupational History   • Not on  "file   Tobacco Use   • Smoking status: Never   • Smokeless tobacco: Never   Vaping Use   • Vaping Use: Never used   Substance and Sexual Activity   • Alcohol use: Never   • Drug use: Defer   • Sexual activity: Defer      Social History     Social History Narrative   • Not on file     Family History   Problem Relation Age of Onset   • Malig Hyperthermia Neg Hx          Review of Systems:   No other pertinent positives or negatives other than what is mentioned in the HPI and below.  Constitutional: Negative for fatigue, fever, or weight loss  HEENT: No active headache.  Pulmonary: Patient denies SOA.  Cardiovascular: Patient denies any chest pain.  Gastrointestinal:  Patient denies active vomiting or diarrhea.  Musculoskeletal: Positive for left knee pain.  Neurological: Patient denies active dizziness or loss of consciousness.  Skin: Patient denies any active bleeding.    Vital signs in last 24 hours:  Temp:  [98.5 °F (36.9 °C)] 98.5 °F (36.9 °C)  Heart Rate:  [67] 67  Resp:  [18] 18  BP: (157)/(96) 157/96  Vitals:    03/23/23 0657   BP: 157/96   BP Location: Left arm   Patient Position: Sitting   Pulse: 67   Resp: 18   Temp: 98.5 °F (36.9 °C)   TempSrc: Oral   SpO2: 99%   Weight: 114 kg (251 lb 5.2 oz)   Height: 177.8 cm (70\")          Physical Exam: 58 y.o. female         General Appearance:  Alert, cooperative, in no acute distress    HEENT:    Atraumatic, Pupils are equal   Neck:   Cervical spine midline, no appreciable JVD   Lungs:     Breathing non-labored and chest rise symmetric    Heart:   Abdomen:     Rectal:    Extremities:   Pulses  Neurovascular:   Skin:  Musculoskeletal:         Pulse regular    Soft, Non-tender or distended    Deferred    No clubbing, cyanosis, or edema    Intact    Cranial nerves 2 - 12 grossly intact, sensation intact    No skin lesions  Left knee skin intact.  Normal motor and sensory exam.  Tenderness to palpation medial.  Compartments are soft.  Foot warm and well-perfused. "     Diagnostic Tests:    Results from last 7 days   Lab Units 03/17/23  1539   WBC 10*3/mm3 7.47   HEMOGLOBIN g/dL 10.6*   HEMATOCRIT % 34.9   PLATELETS 10*3/mm3 348     Results from last 7 days   Lab Units 03/17/23  1539   SODIUM mmol/L 143   POTASSIUM mmol/L 4.2   CHLORIDE mmol/L 108*   CO2 mmol/L 26.0   BUN mg/dL 15   CREATININE mg/dL 0.70   GLUCOSE mg/dL 94   CALCIUM mg/dL 9.0             Assessment:  Left knee medial compartment osteoarthritis      Plan:  The patient voiced understanding of the risks, benefits, and alternative forms of treatment that were discussed and the patient consents to proceed with left medial compartment knee arthroplasty versus total knee arthroplasty.  No changes since being seen in the office.  All questions answered.     Date: 3/23/2023  Polo Jarvis MD

## 2023-03-23 NOTE — PLAN OF CARE
Goal Outcome Evaluation:              Outcome Evaluation: Pt is 59 yo female presenting s/p L partial TKA leading to expected post op weakness and decreased ROM.  At time of PT eval pt was SBA for bed mobility and CGA for transfers and ambulation of 100' w RWx.  Pt was also able to ascend/descend 4 steps w HR on the R.  Pt tolerated TKA protocal ther ex x10 reps.  Pt to d/c home w HHPT and assist from family.  Pt safe to ambulate w nsg while at [facility] and in no further need of acute care PT at this time.

## 2023-03-23 NOTE — CASE MANAGEMENT/SOCIAL WORK
Continued Stay Note  [facility]     Patient Name: Lorenzo Parekh  MRN: [id number]  Today's Date: 3/23/2023    Admit Date: 3/23/2023    Plan: Home with [facility]    Discharge Plan     Plan     Row Name 03/23/23 1306    Plan Home with [facility]                      Discharge Codes    No documentation.                     Shannon Epley, RN

## 2023-03-23 NOTE — ANESTHESIA PREPROCEDURE EVALUATION
Anesthesia Evaluation     Patient summary reviewed and Nursing notes reviewed   NPO Solid Status: > 8 hours             Airway   Mallampati: II  TM distance: >3 FB  Neck ROM: full  no difficulty expected  Dental - normal exam     Pulmonary - negative pulmonary ROS and normal exam   Cardiovascular - normal exam    (+) hypertension,       Neuro/Psych  (+) psychiatric history Anxiety,    GI/Hepatic/Renal/Endo    (+) obesity,       Musculoskeletal (-) negative ROS    Abdominal  - normal exam   Substance History - negative use     OB/GYN negative ob/gyn ROS         Other                        Anesthesia Plan    ASA 3     general with block     (There were no vitals filed for this visit.)  intravenous induction     Anesthetic plan, risks, benefits, and alternatives have been provided, discussed and informed consent has been obtained with: patient.    Plan discussed with CRNA.        CODE STATUS:

## 2023-03-23 NOTE — ANESTHESIA PROCEDURE NOTES
Peripheral Block    Pre-sedation assessment completed: 3/23/2023 7:40 AM    Patient reassessed immediately prior to procedure    Patient location during procedure: pre-op  Start time: 3/23/2023 7:40 AM  Stop time: 3/23/2023 7:45 AM  Reason for block: at surgeon's request and post-op pain management  Performed by  Anesthesiologist: Martin Carter MD  Preanesthetic Checklist  Completed: patient identified, IV checked, site marked, risks and benefits discussed, surgical consent, monitors and equipment checked, pre-op evaluation and timeout performed  Prep:  Pt Position: supine  Sterile barriers:cap, gloves, mask and sterile barriers  Prep: ChloraPrep  Patient monitoring: blood pressure monitoring, continuous pulse oximetry and EKG  Procedure    Sedation: yes  Performed under: local infiltration  Guidance:ultrasound guided    ULTRASOUND INTERPRETATION.  Using ultrasound guidance a 22 G gauge needle was placed in close proximity to the nerve, at which point, under ultrasound guidance anesthetic was injected in the area of the nerve and spread of the anesthesia was seen on ultrasound in close proximity thereto.  There were no abnormalities seen on ultrasound; a digital image was taken; and the patient tolerated the procedure with no complications. Images:still images obtained, printed/placed on chart (U/S to localize the nerve)    Laterality:left  Block Type:adductor canal block  Injection Technique:single-shot  Needle Type:echogenic  Needle Gauge:22 G  Resistance on Injection: less than 15 psi    Medications Used: ropivacaine (NAROPIN) 0.2 % injection - Injection   15 mL - 3/23/2023 7:45:00 AM      Post Assessment  Injection Assessment: negative aspiration for heme, no paresthesia on injection and incremental injection  Patient Tolerance:comfortable throughout block  Complications:no

## 2023-03-23 NOTE — OP NOTE
KNEE ARTHROPLASTY UNICOMPARTMENTAL  Procedure Note    Lorenzo Parekh  3/23/2023    Pre-op Diagnosis:  Left Knee Medial Compartment Osteoarthritis  Post-op Diagnosis:  Same  Procedure:  Left Knee Medial Unicompartmental Arthroplasty, 33542  Surgical Approach: Knee Medial Parapatellar   Surgeon:  Polo Jarvis MD  Anesthesia: General with Block, Anesthesiologist: Gilberto Burciaga MD  Staff: Circulator: Alix Downing RN; Selin Bland RN  Scrub Person: Reece Hirsch; Анна Salazar  Vendor Representative: Tod Richards; Ralf Alves  Assistant: Tino Orr APRN CFA  Estimated Blood Loss: 100ml  Specimens: * No orders in the log *  Drains: none  Complications: None    Components Utilized:   Armaan  Implant Name Type Inv. Item Serial No.  Lot No. LRB No. Used Action   DEV CONTRL TISS STRATAFIX SYMM PDS PLUS MARGE CT-1 45CM - [id number] Implant DEV CONTRL TISS STRATAFIX SYMM PDS PLUS MRAGE CT-1 45CM  [employer]  DIV OF J AND J [id number] Left 2 Implanted   DEV CONTRL TISS STRATAFIXSPIRALMNCRYL PLSPS2 REV3/0 45CM - [id number] Implant DEV CONTRL TISS STRATAFIXSPIRALMNCRYL PLSPS2 REV3/0 45CM  [employer]  DIV OF J AND J [id number] Left 1 Implanted   SCRW MIS HEADED 33MM Implant   Armaan [id number] Left 1 Implanted   SCRW MIS HEADED 33MM Implant   Armaan [id number] Left 1 Implanted   SCRW MIS HEADED 48MM Implant   Armaan [id number] Left 1 Implanted   CMT BONE R 1X40 - [id number] Implant CMT BONE R 1X40  [employer] [id number] Left 1 Implanted   COMP FEM/KN PERSONA PRT KN LM SZ3 - [id number] Implant COMP FEM/KN PERSONA PRT KN LM SZ3  [employer] [id number] Left 1 Implanted   COMP TIB/KN PERSONA PRT KN LM JUSTIN - [id number] Implant COMP TIB/KN PERSONA PRT KN LM JUSTIN  [employer] [id number] Left 1 Implanted   BEAR TIB/KN PERSONA VIVACIT/E PRT KN LM JUSTIN Implant   BIOMET [id number] Left 1 Implanted         Indication for Procedure:  The patient is a 58 y.o. female presents today for a left knee medial  unicompartmental arthroplasty versus total knee arthroplasty procedure because of failure to conservatively manage the patient's pain for arthritis.  She was educated in risks of surgery that could include possible risk of infection, deep venous thrombosis, pulmonary embolism, fracture, neurovascular injury, leg length discrepancy, dislocation, possible persistent pain, need for additional surgeries, anesthetic risks, medical risks including heart attack and stroke, and death.  The discussion occurred in the office pre-operatively, and patient had the opportunity to ask questions, and concerns about the proposed surgery.  She wished to proceed.      Protocols for intravenous antibiotics and venous thrombosis were followed for this patient.  IV antibiotics were infused prior to surgery and will be discontinued within 24 hours of completion of the surgical procedure.  Thrombosis prophylaxis will be initiated within 24 hours of the completion of the surgical procedure.      Procedure:  The patient was seen in the preoperative holding area where her left knee surgical site was marked, preoperative antibiotics were received, a preoperative nerve block was performed.  She was taken to the operating room and placed on the operating table.  The correct left lower extremity was prepped and draped in a typical sterile fashion.  A timeout was performed confirming the correct surgical site and procedure.  Esmarch was used to exsanguinate the leg and tourniquet was inflated to 250 mmHg.    A 10 blade scalpel was used to make a longitudinal incision from slightly above the patella to medial to the tibial tubercle.  A medial rena-patellar arthrotomy was performed with another 10 blade scalpel.  The medial joint line was elevated subperiosteally with electrocautery and a Henley elevator.  Inspection of the knee joint was performed.  Isolated arthritis was noted to the medial compartment.  Therefore, we elected to proceed with the  medial unicompartmental arthroplasty.      At this point, retractors were carefully placed to protect the remainder of the knee.  The tibial cutting jig was assembled.  Careful attention to height, slope, and varus/valgus noted.  4 mm was taken from the medial height of the plateau.  Single pin was utilized to stabilize the cutting jig.  Reciprocating saw was utilized along with oscillating saw to resect the medial tibial plateau.    Next, the distal femoral cutting guide was carefully placed.  Distal cut was made in standard fashion.  Soft tissues were carefully protected.  The femur was sized to a 3.  Careful attention to rotation and positioning was noted.  The remainder of the cuts were made.  Lug holes were drilled.    Attention was placed back on the tibia.  It was sized to an E.  Slot was made for the keel using the reciprocating saw.  Trial implant was impacted.  Excellent fit was noted with no overhang.  Lug holes were drilled.  A trial reduction was then performed with the tibial and femoral components.  Size 9 mm insert was placed.  Full motion and appropriate stability was noted.    Trial implants were removed.  Wound was copiously irrigated and suctioned dry.  Cement was mixed on the back table and applied to the backs of the implants and the fresh dry cut bone surfaces.  Implants were impacted.  Excess cement was removed.  Trial insert was placed until all cement had hardened.  Again it was checked for mobility and stability.  The 9 mm insert was chosen and impacted in standard fashion.  The tourniquet was released.  Hemostasis was noted.  The remainder of 3 L normal saline containing Betadine was pulsed through the knee wound.  The arthrotomy was closed with a series of #1 Vicryl suture and #1 STRATAFIX suture.  Subcutaneous tissues were closed with 2-0 Vicryl suture.  Skin was closed with a 3-0 subcuticular strata fix suture.  Dermabond, Telfa, Tegaderm, and Ace bandages were placed.    Sponge and  needle counts were completed and were correct.  The patient was awakened from anesthetic and was returned to recovery in stable condition.    Postoperative Plan:  The patient will be admitted.  She will be started on Aspirin 81 mg BID for 4 weeks for dvt prophylaxis and have sequential compression devices.  She will be on a 24 hour antibiotic protocol.  She will be weight bearing as tolerated with physical therapy.    KATELIN Osorio assisted for the entirety of the surgical procedure.  He was necessary for retraction, implant placement, and closure of the wound.    No complications were encountered during this surgical procedure.      Polo Jarvis MD     Date: 3/23/2023  Time: 16:21 EDT

## 2023-03-23 NOTE — THERAPY EVALUATION
Patient Name: Lorenzo Parekh  : 1965    MRN: [id number]                              Today's Date: 3/23/2023       Admit Date: 3/23/2023    Visit Dx: No diagnosis found.  Patient Active Problem List   Diagnosis   • Status post left partial knee replacement     Past Medical History:   Diagnosis Date   • Anxiety    • Arthritis    • History of COVID-19 2020   • History of kidney stones    • Hyperlipidemia    • Hypertension    • Knee pain, bilateral      Past Surgical History:   Procedure Laterality Date   • BARTHOLIN CYST MARSUPIALIZATION     • BREAST CYST EXCISION Bilateral    • CYSTOSCOPY W/ LASER LITHOTRIPSY     • LAPAROSCOPIC CHOLECYSTECTOMY     • LAPAROSCOPIC TUBAL LIGATION     • TIBIA FRACTURE SURGERY Right    • URETEROSCOPY BASKET EXTRACTION        General Information     Row Name 23 1456          Physical Therapy Time and Intention    Document Type evaluation  -MD     Mode of Treatment physical therapy  -MD     Row Name 23 1456          General Information    Patient Profile Reviewed yes  -MD     Prior Level of Function independent:  -MD     Existing Precautions/Restrictions fall  -MD     Barriers to Rehab none identified  -MD     Row Name 23 1456          Living Environment    People in Home spouse  -MD     Row Name 23 1456          Home Main Entrance    Number of Stairs, Main Entrance three  -MD     Stair Railings, Main Entrance railings safe and in good condition;railing on right side (ascending)  -MD     Row Name 23 1456          Stairs Within Home, Primary    Number of Stairs, Within Home, Primary none  -MD     Row Name 23 1456          Cognition    Orientation Status (Cognition) oriented x 3  -MD           User Key  (r) = Recorded By, (t) = Taken By, (c) = Cosigned By    Initials Name Provider Type    Jacquelyn Corral, PT Physical Therapist               Mobility     Row Name 23 1457          Bed Mobility    Bed Mobility supine-sit  -MD     Supine-Sit  [place] (Bed Mobility) standby assist  -MD     Row Name 03/23/23 1457          Sit-Stand Transfer    Sit-Stand [place] (Transfers) verbal cues;contact guard  -MD     Assistive Device (Sit-Stand Transfers) walker, front-wheeled  -MD     Row Name 03/23/23 1457          Gait/Stairs (Locomotion)    [place] Level (Gait) verbal cues;contact guard  -MD     Assistive Device (Gait) walker, front-wheeled  -MD     Ambulated day of surgery or within 4 hours of PACU discharge yes  -MD     Distance in Feet (Gait) 100'  -MD     Deviations/Abnormal Patterns (Gait) antalgic  -MD     Bilateral Gait Deviations forward flexed posture  -MD     [place] Level (Stairs) verbal cues;contact guard  -MD     Handrail Location (Stairs) right side (ascending);left side (descending)  -MD     Number of Steps (Stairs) 4  -MD     Ascending Technique (Stairs) step-to-step  -MD     Descending Technique (Stairs) step-to-step  -MD     Comment, (Gait/Stairs) VC to keep RWx infront of her when ambulating  -MD     Row Name 03/23/23 1457          Mobility    Extremity Weight-bearing Status left lower extremity  -MD     Left Lower Extremity (Weight-bearing Status) weight-bearing as tolerated (WBAT)  -MD           User Key  (r) = Recorded By, (t) = Taken By, (c) = Cosigned By    Initials Name Provider Type    Jacquelyn Corral, PT Physical Therapist               Obj/Interventions     Row Name 03/23/23 1458          Range of Motion Comprehensive    General Range of Motion no range of motion deficits identified  -MD     Comment, General Range of Motion except L LE  -MD     Row Name 03/23/23 1458          Strength Comprehensive (MMT)    General Manual Muscle Testing (MMT) Assessment no strength deficits identified  -MD     Comment, General Manual Muscle Testing (MMT) Assessment except L LE  -MD     Row Name 03/23/23 1458          Motor Skills    Therapeutic Exercise --  TKA protocal x10 reps  -MD           User Key  (r) = Recorded By, (t) = Taken  By, (c) = Cosigned By    Initials Name Provider Type    Jacquelyn Corral, PT Physical Therapist               Goals/Plan    No documentation.                Clinical Impression     Row Name 03/23/23 1458          Pain    Pretreatment Pain Rating 3/10  -MD     Pain Location - Side/Orientation Left  -MD     Pain Location - knee  -MD     Pain Intervention(s) Repositioned;Ambulation/increased activity;Cold applied  -MD     Row Name 03/23/23 1458          Plan of Care Review    Outcome Evaluation Pt is 59 yo female presenting s/p L partial TKA leading to expected post op weakness and decreased ROM.  At time of PT eval pt was SBA for bed mobility and CGA for transfers and ambulation of 100' w RWx.  Pt was also able to ascend/descend 4 steps w HR on the R.  Pt tolerated TKA protocal ther ex x10 reps.  Pt to d/c home w HHPT and assist from family.  Pt safe to ambulate w nsg while at [facility] and in no further need of acute care PT at this time.  -MD     Row Name 03/23/23 1458          Therapy Assessment/Plan (PT)    Therapy Frequency (PT) evaluation only  -MD     Row Name 03/23/23 1458          Positioning and Restraints    Pre-Treatment Position in bed  -MD     Post Treatment Position chair  -MD     In Chair reclined;sitting;call light within reach;exit alarm on;with family/caregiver  -MD           User Key  (r) = Recorded By, (t) = Taken By, (c) = Cosigned By    Initials Name Provider Type    Jacquelyn Corral, PT Physical Therapist               Outcome Measures     Row Name 03/23/23 1500          How much help from another person do you currently need...    Turning from your back to your side while in flat bed without using bedrails? 4  -MD     Moving from lying on back to sitting on the side of a flat bed without bedrails? 4  -MD     Moving to and from a bed to a chair (including a wheelchair)? 3  -MD     Standing up from a chair using your arms (e.g., wheelchair, bedside chair)? 3  -MD     Climbing 3-5 steps with a railing? 3   -MD     To walk in hospital room? 3  -MD     AM-PAC 6 Clicks Score (PT) 20  -MD     Highest level of mobility 6 --> Walked 10 steps or more  -MD     Row Name 03/23/23 1500          Functional Assessment    Outcome Measure Options AM-PAC 6 Clicks Basic Mobility (PT)  -MD           User Key  (r) = Recorded By, (t) = Taken By, (c) = Cosigned By    Initials Name Provider Type    Jacquelyn Corral, PT Physical Therapist                             Physical Therapy Education     Title: PT OT SLP Therapies (In Progress)     Topic: Physical Therapy (In Progress)     Point: Mobility training (Not Started)     Learner Progress:  Not documented in this visit.          Point: Home exercise program (Not Started)     Learner Progress:  Not documented in this visit.          Point: Body mechanics (Not Started)     Learner Progress:  Not documented in this visit.          Point: Precautions (Done)     Learning Progress Summary           Patient Acceptance, E, VU by MD at 3/23/2023 1501                               User Key     Initials Effective Dates Name Provider Type Discipline    MD 06/16/21 -  Jacquelyn Rodgers PT Physical Therapist PT              PT Recommendation and Plan     Outcome Evaluation: Pt is 59 yo female presenting s/p L partial TKA leading to expected post op weakness and decreased ROM.  At time of PT eval pt was SBA for bed mobility and CGA for transfers and ambulation of 100' w RWx.  Pt was also able to ascend/descend 4 steps w HR on the R.  Pt tolerated TKA protocal ther ex x10 reps.  Pt to d/c home w HHPT and assist from family.  Pt safe to ambulate w nsg while at [facility] and in no further need of acute care PT at this time.     Time Calculation:    PT Charges     Row Name 03/23/23 0604             Time Calculation    Start Time 1440  -MD      Stop Time 1505  -MD      Time Calculation (min) 25 min  -MD      PT Received On 03/23/23  -MD            User Key  (r) = Recorded By, (t) = Taken By, (c) = Cosigned By    Initials  Name Provider Type    Jacquelyn Corral, PT Physical Therapist              Therapy Charges for Today     Code Description Service Date Service Provider Modifiers Qty    07936136200 HC PT EVAL LOW COMPLEXITY 3 3/23/2023 Jacquelyn Rodgers, PT GP 1    21869755164 HC PT THERAPEUTIC ACT EA 15 MIN 3/23/2023 Jacquelyn Rodgers, PT GP 1        Patient was not wearing a face mask during this therapy encounter. Therapist used appropriate personal protective equipment including mask and gloves.  Mask used was standard procedure mask. Appropriate PPE was worn during the entire therapy session. Hand hygiene was completed before and after therapy session. Patient is not in enhanced droplet precautions.         PT G-Codes  Outcome Measure Options: AM-PAC 6 Clicks Basic Mobility (PT)  AM-PAC 6 Clicks Score (PT): 20  PT Discharge Summary  Anticipated Discharge Disposition (PT): home with assist, home with home health    Jacquelyn Rodgers PT  3/23/2023

## 2023-03-23 NOTE — DISCHARGE PLACEMENT REQUEST
"Lorenzo Parekh (58 y.o. Female)    YOB: 1965  Social Security Number:   Address: [address] [place] [address]  Home Phone: [phone]  MRN: [id number]  [place]: None  Marital Status:         Admission Date: 3/23/23  Admission Type: Elective  Admitting Provider: Polo Jarvis MD  Attending Provider: Polo Jarvis MD  Department, Room/Bed: [facility]SC OR, OSC OR/OSC OR  Discharge Date:   Discharge Disposition:   Discharge Destination:               Attending Provider: Polo Jarvis MD    Allergies: No Known Allergies    Isolation: None   Infection: None   Code Status: Not on file    Ht: 177.8 cm (70\")   Wt: 114 kg (251 lb 5.2 oz)    Admission Cmt: None   Principal Problem: None                Active Insurance as of 3/23/2023     Primary Coverage     Payor Plan Insurance Group Employer/Plan Group    [employer] EMPLOYEE [id number]     Payor Plan Address Payor Plan Phone Number Payor Plan Fax Number Effective Dates    [address] [phone]  1/1/2022 - None Entered    [facility] [id number]       Subscriber Name Subscriber Birth Date Member ID       CHRISTINA PAREKHSHELLIE EASTMAN 1965 [id number]                 Emergency Contacts      (Rel.) Home Phone Work Phone Mobile Phone    Piotr Parekh (Spouse) None None [phone]              "

## 2023-03-24 ENCOUNTER — TRANSCRIBE ORDERS (OUTPATIENT)
Dept: HOME HEALTH SERVICES | Facility: HOME HEALTHCARE | Age: 58
End: 2023-03-24
Payer: COMMERCIAL

## 2023-03-24 ENCOUNTER — HOME HEALTH ADMISSION (OUTPATIENT)
Dept: HOME HEALTH SERVICES | Facility: HOME HEALTHCARE | Age: 58
End: 2023-03-24
Payer: COMMERCIAL

## 2023-03-24 VITALS
HEART RATE: 74 BPM | DIASTOLIC BLOOD PRESSURE: 78 MMHG | TEMPERATURE: 98.3 F | OXYGEN SATURATION: 97 % | WEIGHT: 251.32 LBS | BODY MASS INDEX: 35.98 KG/M2 | RESPIRATION RATE: 16 BRPM | HEIGHT: 70 IN | SYSTOLIC BLOOD PRESSURE: 144 MMHG

## 2023-03-24 DIAGNOSIS — Z96.652 S/P LEFT UNICOMPARTMENTAL KNEE REPLACEMENT: Primary | ICD-10-CM

## 2023-03-24 LAB
ANION GAP SERPL CALCULATED.3IONS-SCNC: 12.7 MMOL/L (ref 5–15)
BUN SERPL-MCNC: 14 MG/DL (ref 6–20)
BUN/CREAT SERPL: 23.7 (ref 7–25)
CALCIUM SPEC-SCNC: 9.7 MG/DL (ref 8.6–10.5)
CHLORIDE SERPL-SCNC: 103 MMOL/L (ref 98–107)
CO2 SERPL-SCNC: 23.3 MMOL/L (ref 22–29)
CREAT SERPL-MCNC: 0.59 MG/DL (ref 0.57–1)
EGFRCR SERPLBLD CKD-EPI 2021: 104.6 ML/MIN/1.73
GLUCOSE SERPL-MCNC: 111 MG/DL (ref 65–99)
HCT VFR BLD AUTO: 33.3 % (ref 34–46.6)
HGB BLD-MCNC: 10.1 G/DL (ref 12–15.9)
POTASSIUM SERPL-SCNC: 4.4 MMOL/L (ref 3.5–5.2)
SODIUM SERPL-SCNC: 139 MMOL/L (ref 136–145)

## 2023-03-24 PROCEDURE — 80048 BASIC METABOLIC PNL TOTAL CA: CPT | Performed by: ORTHOPAEDIC SURGERY

## 2023-03-24 PROCEDURE — 85018 HEMOGLOBIN: CPT | Performed by: ORTHOPAEDIC SURGERY

## 2023-03-24 PROCEDURE — 25010000002 CEFAZOLIN IN DEXTROSE 2-4 GM/100ML-% SOLUTION: Performed by: ORTHOPAEDIC SURGERY

## 2023-03-24 PROCEDURE — G0378 HOSPITAL OBSERVATION PER HR: HCPCS

## 2023-03-24 PROCEDURE — 85014 HEMATOCRIT: CPT | Performed by: ORTHOPAEDIC SURGERY

## 2023-03-24 RX ORDER — ASPIRIN 81 MG/1
81 TABLET ORAL EVERY 12 HOURS SCHEDULED
Qty: 56 TABLET | Refills: 0 | Status: SHIPPED | OUTPATIENT
Start: 2023-03-24 | End: 2023-04-21

## 2023-03-24 RX ORDER — AMOXICILLIN 250 MG
1 CAPSULE ORAL 2 TIMES DAILY
Qty: 30 TABLET | Refills: 0 | Status: SHIPPED | OUTPATIENT
Start: 2023-03-24

## 2023-03-24 RX ORDER — OXYCODONE HYDROCHLORIDE AND ACETAMINOPHEN 5; 325 MG/1; MG/1
1 TABLET ORAL EVERY 6 HOURS PRN
Qty: 30 TABLET | Refills: 0 | Status: SHIPPED | OUTPATIENT
Start: 2023-03-24

## 2023-03-24 RX ADMIN — DOCUSATE SODIUM 50MG AND SENNOSIDES 8.6MG 1 TABLET: 8.6; 5 TABLET, FILM COATED ORAL at 08:48

## 2023-03-24 RX ADMIN — ASPIRIN 81 MG: 81 TABLET, COATED ORAL at 08:48

## 2023-03-24 RX ADMIN — OXYCODONE AND ACETAMINOPHEN 2 TABLET: 5; 325 TABLET ORAL at 02:00

## 2023-03-24 RX ADMIN — CEFAZOLIN SODIUM 2 G: 2 INJECTION, SOLUTION INTRAVENOUS at 00:44

## 2023-03-24 RX ADMIN — OXYCODONE AND ACETAMINOPHEN 2 TABLET: 5; 325 TABLET ORAL at 13:19

## 2023-03-24 RX ADMIN — PRAVASTATIN SODIUM 10 MG: 10 TABLET ORAL at 08:48

## 2023-03-24 RX ADMIN — CITALOPRAM 20 MG: 20 TABLET, FILM COATED ORAL at 08:48

## 2023-03-24 RX ADMIN — HYDROCHLOROTHIAZIDE 12.5 MG: 12.5 TABLET ORAL at 08:48

## 2023-03-24 RX ADMIN — OXYCODONE AND ACETAMINOPHEN 2 TABLET: 5; 325 TABLET ORAL at 08:48

## 2023-03-24 NOTE — PROGRESS NOTES
Orthopedic Progress Note    Subjective :   Patient seen and examined. Resting comfortably. No issues overnight. Pain controlled. Walked yesterday with PT.    Objective :    Vital signs in last 24 hours:  Temp:  [97.4 °F (36.3 °C)-98.7 °F (37.1 °C)] 97.7 °F (36.5 °C)  Heart Rate:  [66-90] 70  Resp:  [16-19] 16  BP: (134-172)/() 138/76  Vitals:    03/23/23 1800 03/23/23 2231 03/24/23 0152 03/24/23 0627   BP: 155/84 138/75 134/73 138/76   BP Location: Left arm Left arm Left arm Left arm   Patient Position: Lying Lying Lying Lying   Pulse: 82 90 75 70   Resp: 17 16 16 16   Temp: 97.4 °F (36.3 °C) 97.7 °F (36.5 °C) 98.7 °F (37.1 °C) 97.7 °F (36.5 °C)   TempSrc: Oral Oral Oral Oral   SpO2: 94% 99% 99% 98%   Weight:       Height:           PHYSICAL EXAM:  Patient is calm, in no acute distress, awake and oriented x 3.  Dressing clean, dry and intact.  No signs of infection.  Swelling is appropriate.  Ecchymosis is appropriate in amount.  Homans test is negative.  Patient is neurovascularly intact distally.  EHL,FHL,TA,GS are intact  DP/TP 2+    LABS:  Results from last 7 days   Lab Units 03/24/23  0446 03/17/23  1539   WBC 10*3/mm3  --  7.47   HEMOGLOBIN g/dL 10.1* 10.6*   HEMATOCRIT % 33.3* 34.9   PLATELETS 10*3/mm3  --  348     Results from last 7 days   Lab Units 03/24/23  0446   SODIUM mmol/L 139   POTASSIUM mmol/L 4.4   CHLORIDE mmol/L 103   CO2 mmol/L 23.3   BUN mg/dL 14   CREATININE mg/dL 0.59   GLUCOSE mg/dL 111*   CALCIUM mg/dL 9.7        Study Result    Narrative & Impression   XR KNEE 1 OR 2 VW LEFT-     INDICATIONS: Postoperative evaluation.     TECHNIQUE: Frontal and lateral views of the left knee     COMPARISON: None available     FINDINGS:      Intact appearing medial left knee arthroplasty hardware is seen with  adjacent surgical soft tissue gas. No acute fracture is identified.         IMPRESSION:     Postsurgical changes.           This report was finalized on 3/23/2023 12:07 PM by Dr. Mickey Cruz M.D.           ASSESSMENT:  Status post left unicondylar knee arthroplasty, POD# 1    Plan:  Continue Physical Therapy, WBAT. ROM as tolerated with Physical therapy  Continue SCDs, Continue with ECASA 81mg one tab PO BID x30 days for DVT prophylaxis.  Dispo planning for home with home health when medically stable.  Patient still remains on 2 L of nasal cannula this morning.  She will need to be weaned off of this and on room air prior to consideration of discharge home with home health care.  Educated on use of incentive spirometer  Follow up in the office in 2 weeks.     Apollo Dominguez PA-C    Date: 3/24/2023  Time: 07:36 EDT

## 2023-03-24 NOTE — DISCHARGE SUMMARY
Discharge Summary    Date of Admission: 3/23/2023  6:34 AM  Date of Discharge:  3/24/2023    Discharge Diagnosis:   Status post left partial knee replacement [Z96.652]      PMHX:   Past Medical History:   Diagnosis Date   • Anxiety    • Arthritis    • History of COVID-19 08/2020   • History of kidney stones    • Hyperlipidemia    • Hypertension    • Knee pain, bilateral        Discharge Disposition  Home-Health Care [facility]    Procedures Performed  Procedure(s):  LEFT KNEE MEDIAL UNICOMPARTMENTAL ARTHROPLASTY       Indication for Admission  Patient is a 58 y.o. female admitted after undergoing the above surgical procedure.  Patient tolerated the procedure well.  They were admitted for post-operative pain control, medical management and physical therapy.  The patient was ambulatory on postoperative day #0.  Patient was immediately started on DVT prophylaxis as well as antibiotics per SCIP protocol.  Postoperative laboratory studies as well as vital signs had remained stable.  They progressed with physical therapy.  They were deemed stable for discharge.      Consults:   Consults     No orders found for last 30 day(s).          Discharge Instructions:  Patient is weight bearing as tolerated on the operative leg.  Patient is to progress range of motion and ambulation as tolerated.  Use walker as needed for stability and gait.  May progress to cane as tolerated.  The dressing should be left on knee until post-operative day 7, and then removed.  Dressing is waterproof. Patient may shower.  Use ace wrap as needed for swelling.  Patient will follow-up in the office in 2 weeks.  Call the office at [phone] for any questions or concerns.      Discharge Medications     Discharge Medications      New Medications      Instructions Start Date   aspirin 81 MG EC tablet   81 mg, Oral, Every 12 Hours Scheduled      oxyCODONE-acetaminophen 5-325 MG per tablet  Commonly known as: PERCOCET   Take 1 tablet orally every 6 hours as  needed for pain.      sennosides-docusate 8.6-50 MG per tablet  Commonly known as: PERICOLACE   1 tablet, Oral, 2 Times Daily         Continue These Medications      Instructions Start Date   citalopram 20 MG tablet  Commonly known as: CeleXA   20 mg, Oral, Daily      diazePAM 5 MG tablet  Commonly known as: VALIUM   5 mg, Oral, As Needed      hydroCHLOROthiazide 12.5 MG oral  Commonly known as: HYDRODIURIL   12.5 mg, Oral, Daily      pravastatin 10 MG tablet  Commonly known as: PRAVACHOL   10 mg, Oral, Daily             Discharge Diet:     Diet Order   Procedures   • Diet: Regular/House Diet; Texture: Regular Texture (IDDSI 7); Fluid Consistency: Thin (IDDSI 0)        Activity at Discharge:  The patient is weightbearing as tolerated.  With the use of a walker.  Progress with physical therapy.  Focus on aggressive range of motion.    Follow-up Appointments  Follow-up in office in 2 weeks.    Test Results Pending at Discharge       Apollo Dominguez PA-C  03/24/23,  10:46 EDT

## 2023-03-24 NOTE — DISCHARGE INSTRUCTIONS
Dr. Polo Jarvis Total Knee Joint Replacement Discharge Instructions:  Office Phone Number: [phone]    I. ACTIVITIES:  1. Exercises:  Complete exercise program as taught by the hospital physical therapist 2 times per day.  You may wean off the walker to a cane when directed by the physical therapist.  Exercise program will be advanced by the physical therapist  During the day be up ambulating every 2 hours (while awake) for short distances  Complete the ankle pump exercises at least 10 times per hour (while awake)  Elevate legs most of the day the first week post operatively and thereafter elevate legs when in bed and for at least 30 minutes during the day. Use cold packs 20-30 minutes approximately 5 times per day. This should be done before and after completing your exercises and at any time you are experiencing pain/ stiffness in your operative extremity.      2. Activities of Daily Living:  No tub baths, hot tubs, or swimming pools for 4 weeks  The clear dressing with thin white gauze strip dressing is waterproof.  You may shower without covering the dressing beginning 3 days after the operation.  After 7 days you may remove the dressing.  If the dressing becomes saturated prior to day 7, it may be changed.  After dressing removal, do not scrub or rub the incision. Allow skin glue to fall off over the next few weeks.  After the dressing is removed, simply let the water run over the incision and pat dry.    II. Restrictions  Follow any movement restrictions that was discussed with you by either Dr. Jarvis or the physical therapist.     Avoid kneeling on the knee that was operated on  Dr. Jarvis will discuss with you when you will be able to drive again at your first post-op appointment.  Weight bearing is as tolerated.  First week stay inside on even terrain. May go up and down stairs one stair at a time utilizing the hand rail.  Once you feel confident, you may venture  outside.    Exercises:  Perform quad sets as instructed by the therapist at least 3 times a day  Perform leg hangs with you heel placed on a chair or footrest and allow you knee to stretch towards a more straightened position several times a day  Work on knee flexion exercises at least three times daily.  Avoid placing a pillow under your knee as this will cause your knee to become more stiff throughout the recovery process.    III. Precautions:  Everyone that comes near you should wash their hands  No elective dental, genital-urinary, or colon procedures or surgical procedures for 12 weeks after surgery unless absolutely necessary.   If dental work or surgical procedure is deemed absolutely necessary within 12 weeks of surgery, you will need to contact Dr. Jarvis's  office as you will need to take antibiotics 1 hour prior to any dental work (including teeth cleanings).  Dr. Jarvis will prescribe prophylactic antibiotics for all dental procedures for one year  as a precautionary measure to minimize risk of infection.  If you are a diabetic or take immunosuppressive medication, you may have to take prophylactic antibiotics the remainder of your life before dental work.    Avoid sick people. If you must be around someone who is ill, they should wear a mask.  Avoid visits to the Emergency Room or Urgent Care unless you are having a life threatening event.   If you have leg swelling you may wear leg compression stocking.   Stockings are to be placed on in the morning and removed at night. Monitor the stockings to ensure that any swelling is not causing the stockings to become too tight. In this case, remove stockings immediately.    IV. INCISION CARE:  Dr. Jarvis takes great care in closing your incision to give you the best opportunity for a healthy incision with minimal scarring. He places sutures below the skin surface that will eventually dissolve.  The incision is then covered with a skin glue which makes the  incision water tight, and minimizes bleeding onto the dressing.  No staples are used.  Occasionally one of the buried stitches may come to the skin surface and may need to be removed.  Please resist the temptation of removing the stitch by yourself.   will be happy to remove it for you.  Bruising around the incision and thigh is normal and to be expected.  Please keep dressing in place at least until post-op day 7. You may remove and replace dressing before day 7 if the dressing begins to fall off or becomes saturated. Wash your hands and under your finger nails prior to dressing changes.  After day 7 as long as incision is dry and intact, you may leave the dressing off and open to air.    If dressing must be changed, utilize dry gauze and paper tape. Avoid touching the side of the gauze that goes against the incision with your hands.  No creams or ointments to the incision until permission given by Dr. Jarvis.  Do not touch or pick at the incision, or try to remove any sutures or skin glue.  Check dressing every day and notify surgeon immediately if any of the following signs or symptoms are noted:  Increase in redness  Increase in swelling of the entire extremity that does not go away with elevation.  Notify office that you may have a blood clot.    Drainage oozing from the incision  Pulling apart of the edges of the incision  Increase in overall body temperature (greater than 100.5 degrees)    V. Medications:   1. Anticoagulants: You will be discharged on an anticoagulant. This is a prophylactic medication that helps prevent blood clots during your post-operative period. The type and length of dosage varies based on your individual needs, procedure performed, and Dr. Alvarez preference.  While taking the anticoagulant, you should avoid taking any additional aspirin than what is prescribed.   Notify surgeon immediately if any aldair bleeding is noted in the urine, stool, emesis, or from the nose or the  incision. Blood in the stool will often appear as black rather than red. Blood in urine may appear as pink. Blood in emesis may appear as brown/black like coffee grounds.  You will need to apply pressure for longer periods of time to any cuts or abrasions to stop bleeding  Avoid alcohol while taking anticoagulants.    2. Stool Softeners: You will be at greater risk of constipation after surgery due to being less mobile and the pain medications.   Take stool softeners as instructed by your surgeon while on pain medications. Over the counter Colace 100 mg 1-2 capsules twice daily.   If stools become too loose or too frequent, please decreases the dosage or stop the stool softener.  If constipation occurs despite use of stool softeners, you are to continue the stool softeners and add a laxative (Milk of Magnesia 1 ounce daily as needed).  If no bowel movement occurs past 3 days, then purchase Magnesium citrate and drink 1/2 bottle every 8 hours (on ice tastes better) until success. If no bowel movement by post-operative day 5 please call Dr. Dudley office for further instructions.   You may need to decrease or stop your pain medications if bowel movements to not occur.     Drink plenty of fluids, and eat fruits and vegetables during your recovery time.    3. Pain Medications utilized after surgery are narcotics and the law requires that the following information be given to all patients that are prescribed narcotics:  CLASSIFICATION: Pain medications are called Opioids and are narcotics  LEGALITIES: It is illegal to share narcotics with others and to drive within 24 hours of taking narcotics  POTENTIAL SIDE EFFECTS: Potential side effects of opioids include: nausea, vomiting, itching, dizziness, drowsiness, dry mouth, constipation, and difficulty urinating.  POTENTIAL ADVERSE EFFECTS:   Opioid tolerance can develop with use of pain medications and this simply means that it requires more and more of the medication to  "control pain; however, this is seen more in patients that use opioids for longer periods of time.  Opioid dependence can develop with use of Opioids and this simply means that to stop the medication can cause withdrawal symptoms; however, this is seen with patients that use Opioids for longer periods of time.  Opioid addiction can develop with use of Opioids and the incidence of this is very unlikely in patients who take the medications as ordered and stop the medications as instructed.  Opioid overdose can be dangerous, but is unlikely when the medication is taken as ordered and stopped when ordered. It is important not to mix opioids with alcohol or with and type of sedative such as Benadryl as this can lead to over sedation and respiratory difficulty.  DOSAGE:   Pain medications will need to be taken consistently for the first few days to decrease pain and promote adequate pain relief and participation in physical therapy.  After the initial surgical pain begins to resolve, you may begin to decrease the pain medication. By the end of 6 weeks, you should be off of pain medications except for before physical therapy or to help with pain when attempting to fall asleep.  Pain medications will be tapered to lesser dosages as you are further from your surgical day.  No pain medications will be provided after 3 months from surgery.     Refills will not be given by the office during evening hours, or weekends.  To seek refills on pain medications during the post-operative period, you must call the office 48 hours in advance to request the refill. The office will then notify you when to  the prescription. DO NOT wait until you are out of the medication to request a refill.  They can not be \"called in\" to the pharmacy.      How to Wean Off Pain Medication:   As you begin to feel better, gradually wean off the narcotic pain medication and begin to use it only for breakthrough pain.  Gradually reduce the total number " of pills you take each day.  This can be done by taking fewer pills at a time or by increasing the amount of time between each pill.    For example, if you were taking 2 pills every 6 hours you would be taking a total of 8 pills per day.  Reduce this to 6 pills per day, then 4-5 and so on.  This can be done by taking 1 pill at a time instead of 2, or by taking the pills every 8 hours instead of every 6.    As you begin to wean from the narcotic pain medication, begin substituting with over the counter tylenol when you are not taking the narcotic.  Limit total tylenol dosage to less than 4 grams per day.    V. FOLLOW-UP VISITS:  You will need to follow up in the office with Dr. Jarvis at 2 weeks.  Please call [phone] if you need to confirm or reschedule your appointment time.   If you have any concerns or suspected complications prior to your follow up visit, please call your surgeons office. Do not wait until your appointment time if you suspect complications. These will need to be addressed in the office promptly.

## 2023-03-24 NOTE — PROGRESS NOTES
Patient is discharging today . Spoke to patient who is agreeable to home health and has no current home health. Face sheet information is correct and will transcribe home health orders for PT per Dr Jarvis/ CEDRIC Haynes. Thank you !

## 2023-03-24 NOTE — PLAN OF CARE
Goal Outcome Evaluation:  Plan of Care Reviewed With: patient        Progress: improving   Pt is a post op day 1 of a left uni knee, dressing is clean dry and intact. Pt continues with the ACE wrap. Pt has ambulated to the bathroom with an assist x1, voiding function intact. Pt continues with PO pain meds that provide relief. Pt educated on the importance of monitoring blood pressures related to comorbidity of HTN. Pt voiced understanding. Pt is resting at this time, will continue to monitor.

## 2023-03-24 NOTE — PROGRESS NOTES
Continued Stay Note  [facility]     Patient Name: Lorenzo Parekh  MRN: [id number]  Today's Date: 3/24/2023    Admit Date: 3/23/2023    Plan: Home with [facility]   Discharge Plan     Row Name 03/24/23 1421       Plan    Final Discharge Disposition Code 01 - home or self-care    Final Note D/c home with [facility]               Discharge Codes    No documentation.               Expected Discharge Date and Time     Expected Discharge Date Expected Discharge Time    Mar 24, 2023             Gisela Christensen RN

## 2023-03-25 ENCOUNTER — HOME CARE VISIT (OUTPATIENT)
Dept: HOME HEALTH SERVICES | Facility: HOME HEALTHCARE | Age: 58
End: 2023-03-25
Payer: COMMERCIAL

## 2023-03-25 VITALS
OXYGEN SATURATION: 97 % | TEMPERATURE: 97.7 F | DIASTOLIC BLOOD PRESSURE: 80 MMHG | SYSTOLIC BLOOD PRESSURE: 112 MMHG | HEART RATE: 77 BPM

## 2023-03-25 PROCEDURE — G0151 HHCP-SERV OF PT,EA 15 MIN: HCPCS
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
## 2023-03-27 ENCOUNTER — HOME CARE VISIT (OUTPATIENT)
Dept: HOME HEALTH SERVICES | Facility: HOME HEALTHCARE | Age: 58
End: 2023-03-27
Payer: COMMERCIAL

## 2023-03-27 ENCOUNTER — TELEPHONE (OUTPATIENT)
Dept: ORTHOPEDIC SURGERY | Facility: HOSPITAL | Age: 58
End: 2023-03-27
Payer: COMMERCIAL

## 2023-03-27 VITALS
TEMPERATURE: 97.1 F | DIASTOLIC BLOOD PRESSURE: 72 MMHG | RESPIRATION RATE: 16 BRPM | SYSTOLIC BLOOD PRESSURE: 130 MMHG | HEART RATE: 94 BPM | OXYGEN SATURATION: 97 %

## 2023-03-27 PROCEDURE — G0151 HHCP-SERV OF PT,EA 15 MIN: HCPCS

## 2023-03-27 NOTE — HOME HEALTH
Plan for next visit: review exercises, increase reps to 15 or 20    Subjective:  Dr. Smith's office is supposed to be calling me back.  The oxycodone only lasts for 3 hours and I can't take except every 6 hours.    Falls since last visit: none  Home/Social Environment:  lives with  in home with steps to enter/ exit and steps to basement

## 2023-03-27 NOTE — TELEPHONE ENCOUNTER
Post op day 4  Discharge Instructions:  Ask patient about his or her discharge instructions  ?  Patient confirmed understanding   ?  Further instruction needed   What, if any, recommendations, teaching, or interventions did you provide? Click or tap here to enter text.  Health status:  Pain controlled No   She is taking the medication every 4 hours (it lasts about 3-3.5) instead of the every 6 it’s written for  Recommended interventions:  Yes  incision/dressing status   ?  Clean without redness, drainage, odor  ?  Redness    ?  Drainage - color Click or tap here to enter text.  ?  Odor  JUAN - Green light blinking Choose an item.  Difficulties urination No  Last BM 3/26/2023 (if no BM by day 3-recommend OTC suppository or fleets enema)  No isues  Medications:  ?Medications reviewed with patient/family/caregiver  Patient taking medications as prescribed?   Yes  If not taking medications as prescribed, note specific medicine(s) and reason for each:  Click or tap here to enter text.  Hospital Follow Up Plan:  Follow up Appointment with Orthopedic surgeon:  ?Has f/u appointment                ?Scheduled f/u appointment  Home Care ordered at discharge?    Yes        Home Care started, or contact made?    Yes   If no, action taken:   DME obtained/used in home?         Yes   Using IS  Yes   Other information:  Ms. Parekh said she is doing ok. She’s hanging in there. She is having a lot of trouble with pain control. She was to be a same day discharge but ended up staying overnight. She has called the office to see if she can’t get an increased dose or something. The medication she has now says to take it every 6 hours. It lasts about 3.5 and she is taking it every 4 but she knows she is going to run out. Told her I would try to let the MD know as well. She is trying to do the exercises and ambulate. She is working with PT. She does have some swelling. She is icing and elevating. Dressing remains CDI. BM’s are good. Ms.  Iglesia doesn’t have any questions at this time. She just wants something the help get this pain under control.

## 2023-03-29 ENCOUNTER — HOME CARE VISIT (OUTPATIENT)
Dept: HOME HEALTH SERVICES | Facility: HOME HEALTHCARE | Age: 58
End: 2023-03-29
Payer: COMMERCIAL

## 2023-03-29 VITALS
SYSTOLIC BLOOD PRESSURE: 120 MMHG | TEMPERATURE: 97.9 F | DIASTOLIC BLOOD PRESSURE: 76 MMHG | OXYGEN SATURATION: 99 % | HEART RATE: 82 BPM

## 2023-03-29 PROCEDURE — G0151 HHCP-SERV OF PT,EA 15 MIN: HCPCS

## 2023-03-30 NOTE — HOME HEALTH
"Patient reports she went about 12 hours without pain medication over night due to a lag in refill.  Patient obtained this morning with advisal from MD to take the medication as 1 every 3 hours OR 2 every 6 hours and discontinue the ibuprofen.  Medication profile has been updated.  Patient states she had to rest, ice and stay \"quiet\" most of the night to manage the pain but was able to do the bike this morning and tries to do exercises in bed when her leg is aching.  She feels as if she is doing a little better today and her bowel movements have regulated.    Plan for Next Visit:  -Strive to improve range of motion +1 to 88 degrees   -Assess ability to transition to cane with ambulation  -Review standing exercises and advance if tolerated  -Assess edema and review edema management"

## 2023-03-31 ENCOUNTER — HOME CARE VISIT (OUTPATIENT)
Dept: HOME HEALTH SERVICES | Facility: HOME HEALTHCARE | Age: 58
End: 2023-03-31
Payer: COMMERCIAL

## 2023-03-31 VITALS
SYSTOLIC BLOOD PRESSURE: 120 MMHG | HEART RATE: 79 BPM | RESPIRATION RATE: 16 BRPM | DIASTOLIC BLOOD PRESSURE: 72 MMHG | TEMPERATURE: 97.1 F | OXYGEN SATURATION: 98 %

## 2023-03-31 PROCEDURE — G0151 HHCP-SERV OF PT,EA 15 MIN: HCPCS

## 2023-03-31 NOTE — HOME HEALTH
Plan for next visit: review exercises, instruct in cane    Subjective:  The pain medicine is helping but not giving me complete relief.      Falls since last visit: none  Home/Social Environment:  lives with  in home with steps to enter/ exit and steps to basement

## 2023-04-03 ENCOUNTER — HOME CARE VISIT (OUTPATIENT)
Dept: HOME HEALTH SERVICES | Facility: HOME HEALTHCARE | Age: 58
End: 2023-04-03
Payer: COMMERCIAL

## 2023-04-03 VITALS
HEART RATE: 73 BPM | TEMPERATURE: 97.3 F | OXYGEN SATURATION: 98 % | DIASTOLIC BLOOD PRESSURE: 90 MMHG | RESPIRATION RATE: 16 BRPM | SYSTOLIC BLOOD PRESSURE: 132 MMHG

## 2023-04-03 PROCEDURE — G0151 HHCP-SERV OF PT,EA 15 MIN: HCPCS

## 2023-04-03 NOTE — HOME HEALTH
Plan for next visit: review exercises, instruct in steps    Subjective:  I went to a .  I'm sleeping through the night.    Falls since last visit: none  Home/Social Environment:  lives with  in home with steps to enter/ exit and steps to basement

## 2023-04-05 ENCOUNTER — HOME CARE VISIT (OUTPATIENT)
Dept: HOME HEALTH SERVICES | Facility: HOME HEALTHCARE | Age: 58
End: 2023-04-05
Payer: COMMERCIAL

## 2023-04-05 VITALS
OXYGEN SATURATION: 98 % | HEART RATE: 81 BPM | DIASTOLIC BLOOD PRESSURE: 80 MMHG | TEMPERATURE: 97 F | SYSTOLIC BLOOD PRESSURE: 122 MMHG | RESPIRATION RATE: 16 BRPM

## 2023-04-05 PROCEDURE — G0151 HHCP-SERV OF PT,EA 15 MIN: HCPCS

## 2023-04-05 NOTE — HOME HEALTH
Plan for next visit: [facility] discharge    Subjective:  I don't have a taste for anything.  I have to force myself to eat.    Falls since last visit: none  Home/Social Environment:  lives with  in home with steps to enter/ exit and steps to basement

## 2023-04-07 ENCOUNTER — HOME CARE VISIT (OUTPATIENT)
Dept: HOME HEALTH SERVICES | Facility: HOME HEALTHCARE | Age: 58
End: 2023-04-07
Payer: COMMERCIAL

## 2023-04-07 VITALS
HEART RATE: 81 BPM | TEMPERATURE: 97.3 F | DIASTOLIC BLOOD PRESSURE: 80 MMHG | OXYGEN SATURATION: 99 % | SYSTOLIC BLOOD PRESSURE: 118 MMHG | RESPIRATION RATE: 16 BRPM

## 2023-04-07 PROCEDURE — G0151 HHCP-SERV OF PT,EA 15 MIN: HCPCS

## 2023-04-07 NOTE — HOME HEALTH
Subjective:  I'm going to out patient PT next Wednesday.  I saw the doctor yesterday.  Everything went well.   Falls since last visit: none  Home/Social Environment:  lives with  in home with steps to enter/ exit and steps to basement

## 2024-12-13 ENCOUNTER — LAB REQUISITION (OUTPATIENT)
Dept: LAB | Facility: HOSPITAL | Age: 59
End: 2024-12-13
Payer: COMMERCIAL

## 2024-12-13 DIAGNOSIS — K13.79 OTHER LESIONS OF ORAL MUCOSA: ICD-10-CM

## 2024-12-13 DIAGNOSIS — C09.9 MALIGNANT NEOPLASM OF TONSIL, UNSPECIFIED: ICD-10-CM

## 2024-12-13 PROCEDURE — 88304 TISSUE EXAM BY PATHOLOGIST: CPT | Performed by: OTOLARYNGOLOGY

## 2024-12-17 LAB
CYTO UR: NORMAL
LAB AP CASE REPORT: NORMAL
LAB AP CLINICAL INFORMATION: NORMAL
PATH REPORT.FINAL DX SPEC: NORMAL
PATH REPORT.GROSS SPEC: NORMAL

## (undated) DEVICE — 2108 SERIES SAGITTAL BLADE, NO OFFSET  (12.4 X 1.19 X 82.1MM)

## (undated) DEVICE — NEEDLE, QUINCKE, 18GX3.5": Brand: MEDLINE

## (undated) DEVICE — GLV SURG BIOGEL LTX PF 8

## (undated) DEVICE — BNDG ELAS ELITE V/CLOSE 6IN 5YD LF STRL

## (undated) DEVICE — APPL CHLORAPREP HI/LITE 26ML ORNG

## (undated) DEVICE — DRSNG SURESITE123 4X10IN

## (undated) DEVICE — GLV SURG PREMIERPRO ORTHO LTX PF SZ8 BRN

## (undated) DEVICE — SUT VICRYL 1 CT1 27IN  JJ40G

## (undated) DEVICE — TBG PENCL TELESCP MEGADYNE SMOKE EVAC 10FT

## (undated) DEVICE — RECIPROCATING BLADE HEAVY DUTY LONG, OFFSET  (77.6 X 0.77 X 11.2MM)

## (undated) DEVICE — DRSNG TELFA PAD NONADH STR 1S 3X8IN

## (undated) DEVICE — GLV SURG SIGNATURE ESSENTIAL PF LTX SZ8.5

## (undated) DEVICE — STRAP STIRUP WO/ RNG

## (undated) DEVICE — TRAP FLD MINIVAC MEGADYNE 100ML

## (undated) DEVICE — ADHS SKIN SURG TISS VISC PREMIERPRO EXOFIN HI/VISC FAST/DRY

## (undated) DEVICE — DRAPE,U/ SHT,SPLIT,PLAS,STERIL: Brand: MEDLINE

## (undated) DEVICE — DRAPE,REIN 53X77,STERILE: Brand: MEDLINE

## (undated) DEVICE — DUAL CUT SAGITTAL BLADE

## (undated) DEVICE — ANTIBACTERIAL UNDYED BRAIDED (POLYGLACTIN 910), SYNTHETIC ABSORBABLE SUTURE: Brand: COATED VICRYL

## (undated) DEVICE — BNDG ELAS ELITE V/CLOSE 4IN 5YD LF STRL

## (undated) DEVICE — PK KN TOTL 40